# Patient Record
Sex: MALE | Race: WHITE | NOT HISPANIC OR LATINO | URBAN - METROPOLITAN AREA
[De-identification: names, ages, dates, MRNs, and addresses within clinical notes are randomized per-mention and may not be internally consistent; named-entity substitution may affect disease eponyms.]

---

## 2018-03-23 ENCOUNTER — EMERGENCY (EMERGENCY)
Facility: HOSPITAL | Age: 28
LOS: 1 days | Discharge: HOME | End: 2018-03-23
Attending: EMERGENCY MEDICINE | Admitting: EMERGENCY MEDICINE
Payer: SELF-PAY

## 2018-03-23 VITALS
OXYGEN SATURATION: 99 % | RESPIRATION RATE: 18 BRPM | SYSTOLIC BLOOD PRESSURE: 111 MMHG | TEMPERATURE: 100 F | WEIGHT: 195.11 LBS | HEART RATE: 53 BPM | DIASTOLIC BLOOD PRESSURE: 71 MMHG | HEIGHT: 71 IN

## 2018-03-23 LAB
ALBUMIN SERPL ELPH-MCNC: 4.7 G/DL — SIGNIFICANT CHANGE UP (ref 3.3–5.2)
ALP SERPL-CCNC: 56 U/L — SIGNIFICANT CHANGE UP (ref 40–120)
ALT FLD-CCNC: 30 U/L — SIGNIFICANT CHANGE UP
ANION GAP SERPL CALC-SCNC: 15 MMOL/L — SIGNIFICANT CHANGE UP (ref 5–17)
AST SERPL-CCNC: 19 U/L — SIGNIFICANT CHANGE UP
BASOPHILS # BLD AUTO: 0 K/UL — SIGNIFICANT CHANGE UP (ref 0–0.2)
BASOPHILS NFR BLD AUTO: 0.1 % — SIGNIFICANT CHANGE UP (ref 0–2)
BILIRUB SERPL-MCNC: 1.1 MG/DL — SIGNIFICANT CHANGE UP (ref 0.4–2)
BUN SERPL-MCNC: 13 MG/DL — SIGNIFICANT CHANGE UP (ref 8–20)
CALCIUM SERPL-MCNC: 9.7 MG/DL — SIGNIFICANT CHANGE UP (ref 8.6–10.2)
CHLORIDE SERPL-SCNC: 97 MMOL/L — LOW (ref 98–107)
CO2 SERPL-SCNC: 24 MMOL/L — SIGNIFICANT CHANGE UP (ref 22–29)
CREAT SERPL-MCNC: 1.05 MG/DL — SIGNIFICANT CHANGE UP (ref 0.5–1.3)
EOSINOPHIL # BLD AUTO: 0 K/UL — SIGNIFICANT CHANGE UP (ref 0–0.5)
EOSINOPHIL NFR BLD AUTO: 0 % — SIGNIFICANT CHANGE UP (ref 0–5)
GLUCOSE SERPL-MCNC: 99 MG/DL — SIGNIFICANT CHANGE UP (ref 70–115)
HCT VFR BLD CALC: 47.4 % — SIGNIFICANT CHANGE UP (ref 42–52)
HGB BLD-MCNC: 16.4 G/DL — SIGNIFICANT CHANGE UP (ref 14–18)
LACTATE BLDV-MCNC: 1.5 MMOL/L — SIGNIFICANT CHANGE UP (ref 0.5–2)
LYMPHOCYTES # BLD AUTO: 0.9 K/UL — LOW (ref 1–4.8)
LYMPHOCYTES # BLD AUTO: 6 % — LOW (ref 20–55)
MCHC RBC-ENTMCNC: 31.2 PG — HIGH (ref 27–31)
MCHC RBC-ENTMCNC: 34.6 G/DL — SIGNIFICANT CHANGE UP (ref 32–36)
MCV RBC AUTO: 90.3 FL — SIGNIFICANT CHANGE UP (ref 80–94)
MONOCYTES # BLD AUTO: 1.4 K/UL — HIGH (ref 0–0.8)
MONOCYTES NFR BLD AUTO: 9.6 % — SIGNIFICANT CHANGE UP (ref 3–10)
NEUTROPHILS # BLD AUTO: 12.6 K/UL — HIGH (ref 1.8–8)
NEUTROPHILS NFR BLD AUTO: 84 % — HIGH (ref 37–73)
PLATELET # BLD AUTO: 227 K/UL — SIGNIFICANT CHANGE UP (ref 150–400)
POTASSIUM SERPL-MCNC: 3.8 MMOL/L — SIGNIFICANT CHANGE UP (ref 3.5–5.3)
POTASSIUM SERPL-SCNC: 3.8 MMOL/L — SIGNIFICANT CHANGE UP (ref 3.5–5.3)
PROT SERPL-MCNC: 8.1 G/DL — SIGNIFICANT CHANGE UP (ref 6.6–8.7)
RBC # BLD: 5.25 M/UL — SIGNIFICANT CHANGE UP (ref 4.6–6.2)
RBC # FLD: 12.3 % — SIGNIFICANT CHANGE UP (ref 11–15.6)
SODIUM SERPL-SCNC: 136 MMOL/L — SIGNIFICANT CHANGE UP (ref 135–145)
WBC # BLD: 15 K/UL — HIGH (ref 4.8–10.8)
WBC # FLD AUTO: 15 K/UL — HIGH (ref 4.8–10.8)

## 2018-03-23 PROCEDURE — 99284 EMERGENCY DEPT VISIT MOD MDM: CPT

## 2018-03-23 RX ORDER — SODIUM CHLORIDE 9 MG/ML
2000 INJECTION INTRAMUSCULAR; INTRAVENOUS; SUBCUTANEOUS ONCE
Qty: 0 | Refills: 0 | Status: COMPLETED | OUTPATIENT
Start: 2018-03-23 | End: 2018-03-23

## 2018-03-23 RX ORDER — KETOROLAC TROMETHAMINE 30 MG/ML
30 SYRINGE (ML) INJECTION ONCE
Qty: 0 | Refills: 0 | Status: DISCONTINUED | OUTPATIENT
Start: 2018-03-23 | End: 2018-03-23

## 2018-03-23 RX ADMIN — SODIUM CHLORIDE 1000 MILLILITER(S): 9 INJECTION INTRAMUSCULAR; INTRAVENOUS; SUBCUTANEOUS at 23:22

## 2018-03-23 RX ADMIN — Medication 30 MILLIGRAM(S): at 23:21

## 2018-03-23 NOTE — ED ADULT TRIAGE NOTE - CHIEF COMPLAINT QUOTE
Pt c/o sore throat since last night with loss of appetite, pt reports diaphoresis at tachypnea at home, "woke up with 103.9 fever and last took tylenol at 3", c/o "getting tired to breathe"

## 2018-03-24 VITALS
RESPIRATION RATE: 18 BRPM | TEMPERATURE: 100 F | HEART RATE: 110 BPM | OXYGEN SATURATION: 98 % | SYSTOLIC BLOOD PRESSURE: 112 MMHG | DIASTOLIC BLOOD PRESSURE: 57 MMHG

## 2018-03-24 LAB
APPEARANCE UR: CLEAR — SIGNIFICANT CHANGE UP
BACTERIA # UR AUTO: ABNORMAL
BILIRUB UR-MCNC: NEGATIVE — SIGNIFICANT CHANGE UP
COLOR SPEC: YELLOW — SIGNIFICANT CHANGE UP
DIFF PNL FLD: NEGATIVE — SIGNIFICANT CHANGE UP
EPI CELLS # UR: SIGNIFICANT CHANGE UP
GLUCOSE UR QL: NEGATIVE MG/DL — SIGNIFICANT CHANGE UP
KETONES UR-MCNC: ABNORMAL
LEUKOCYTE ESTERASE UR-ACNC: ABNORMAL
NITRITE UR-MCNC: NEGATIVE — SIGNIFICANT CHANGE UP
PH UR: 8 — SIGNIFICANT CHANGE UP (ref 5–8)
PROT UR-MCNC: 15 MG/DL
RBC CASTS # UR COMP ASSIST: SIGNIFICANT CHANGE UP /HPF (ref 0–4)
S PYO AG SPEC QL IA: POSITIVE
SP GR SPEC: 1.01 — SIGNIFICANT CHANGE UP (ref 1.01–1.02)
UROBILINOGEN FLD QL: 1 MG/DL
WBC UR QL: SIGNIFICANT CHANGE UP

## 2018-03-24 PROCEDURE — 81001 URINALYSIS AUTO W/SCOPE: CPT

## 2018-03-24 PROCEDURE — 96374 THER/PROPH/DIAG INJ IV PUSH: CPT

## 2018-03-24 PROCEDURE — 87081 CULTURE SCREEN ONLY: CPT

## 2018-03-24 PROCEDURE — 99284 EMERGENCY DEPT VISIT MOD MDM: CPT | Mod: 25

## 2018-03-24 PROCEDURE — 85027 COMPLETE CBC AUTOMATED: CPT

## 2018-03-24 PROCEDURE — 80053 COMPREHEN METABOLIC PANEL: CPT

## 2018-03-24 PROCEDURE — 83605 ASSAY OF LACTIC ACID: CPT

## 2018-03-24 PROCEDURE — 87880 STREP A ASSAY W/OPTIC: CPT

## 2018-03-24 PROCEDURE — 36415 COLL VENOUS BLD VENIPUNCTURE: CPT

## 2018-03-24 PROCEDURE — 96375 TX/PRO/DX INJ NEW DRUG ADDON: CPT

## 2018-03-24 PROCEDURE — 87040 BLOOD CULTURE FOR BACTERIA: CPT

## 2018-03-24 RX ORDER — AMPICILLIN SODIUM AND SULBACTAM SODIUM 250; 125 MG/ML; MG/ML
3 INJECTION, POWDER, FOR SUSPENSION INTRAMUSCULAR; INTRAVENOUS ONCE
Qty: 0 | Refills: 0 | Status: COMPLETED | OUTPATIENT
Start: 2018-03-24 | End: 2018-03-24

## 2018-03-24 RX ORDER — DEXAMETHASONE 0.5 MG/5ML
10 ELIXIR ORAL ONCE
Qty: 0 | Refills: 0 | Status: COMPLETED | OUTPATIENT
Start: 2018-03-24 | End: 2018-03-24

## 2018-03-24 RX ADMIN — AMPICILLIN SODIUM AND SULBACTAM SODIUM 200 GRAM(S): 250; 125 INJECTION, POWDER, FOR SUSPENSION INTRAMUSCULAR; INTRAVENOUS at 02:14

## 2018-03-24 RX ADMIN — Medication 1 TABLET(S): at 03:14

## 2018-03-24 RX ADMIN — Medication 102 MILLIGRAM(S): at 02:09

## 2018-03-24 NOTE — ED PROVIDER NOTE - NS ED ROS FT
no weight change, +fever or chills  no rash, no bruises  no visual changes no eye discharge  no cough cold or congestion, +sore throat, +difficulty swallowing   no sob, no chest pain  no orthopnea, no pnd  no abd pain, no v/d, +nausea  no hematuria, no change in urinary habits  no joint pain, no deformity  no headache, no paresthesia

## 2018-03-24 NOTE — ED PROVIDER NOTE - CONDUCTED A DETAILED DISCUSSION WITH PATIENT AND/OR GUARDIAN REGARDING, MDM
lab results return to ED if symptoms worsen, persist or questions arise/lab results/need for outpatient follow-up

## 2018-03-24 NOTE — ED ADULT NURSE NOTE - OBJECTIVE STATEMENT
patient states that he has had throat pain, pressure to his head and ears and shaking. patient states that he is unable to swallow, given ice chips with minimal relief. patient skin warm to touch, denies any difficulty breathing not appearing in any respiratory distress. patient states that he last took motrin at 2pm yesterday

## 2018-03-24 NOTE — ED PROVIDER NOTE - OBJECTIVE STATEMENT
28 y/o M pt with a hx of chicken pox, shingles presents with difficulty swallowing, sore throat, fevers, loss of appetite, nausea x 2 days. 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 Denies diarrhea, CP, SOB, abd pain. NKDA. 28 y/o M pt with a hx of chicken pox, shingles presents with difficulty swallowing, sore throat, fevers, loss of appetite, nausea x 2 days. 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 no complaints diarrhea, CP, SOB, abd pain. NKDA.

## 2018-03-24 NOTE — ED PROVIDER NOTE - PROGRESS NOTE DETAILS
labs discussed with patient  follow upiwth medical doctor , picked augment, mild swelling at palate noted, early phlegnum vs cellulitis

## 2018-03-24 NOTE — ED PROVIDER NOTE - PHYSICAL EXAMINATION
Constitutional : Appears fatigue, talking in full sentences  Head :NC AT , no swelling  Eyes :eomi, no swelling  Mouth :mm moist, difficulty swallowing saliva, b/l pharyngeal swelling with exudates , minimal swelling in distal phalanx   Neck : supple, trachea in midline  Chest :Kj air entry, symm chest expansion, no distress  Heart :S1 S2 distant  Abdomen :abd soft, non tender  Musc/Skel :ext no swelling, no deformity, no spine tenderness, distal pulses present  Neuro  :AAO 3 no focal deficits  skin: no rash

## 2018-03-28 LAB
CULTURE RESULTS: SIGNIFICANT CHANGE UP
CULTURE RESULTS: SIGNIFICANT CHANGE UP
SPECIMEN SOURCE: SIGNIFICANT CHANGE UP
SPECIMEN SOURCE: SIGNIFICANT CHANGE UP

## 2020-02-05 ENCOUNTER — EMERGENCY (EMERGENCY)
Facility: HOSPITAL | Age: 30
LOS: 1 days | Discharge: HOME | End: 2020-02-05
Attending: EMERGENCY MEDICINE
Payer: COMMERCIAL

## 2020-02-05 VITALS
TEMPERATURE: 99 F | RESPIRATION RATE: 20 BRPM | HEART RATE: 102 BPM | SYSTOLIC BLOOD PRESSURE: 116 MMHG | OXYGEN SATURATION: 98 % | DIASTOLIC BLOOD PRESSURE: 67 MMHG

## 2020-02-05 VITALS
WEIGHT: 195.11 LBS | RESPIRATION RATE: 20 BRPM | HEART RATE: 129 BPM | OXYGEN SATURATION: 99 % | DIASTOLIC BLOOD PRESSURE: 76 MMHG | SYSTOLIC BLOOD PRESSURE: 119 MMHG | TEMPERATURE: 100 F

## 2020-02-05 DIAGNOSIS — Z90.49 ACQUIRED ABSENCE OF OTHER SPECIFIED PARTS OF DIGESTIVE TRACT: Chronic | ICD-10-CM

## 2020-02-05 LAB
ALBUMIN SERPL ELPH-MCNC: 4.8 G/DL — SIGNIFICANT CHANGE UP (ref 3.3–5.2)
ALP SERPL-CCNC: 44 U/L — SIGNIFICANT CHANGE UP (ref 40–120)
ALT FLD-CCNC: 51 U/L — HIGH
ANION GAP SERPL CALC-SCNC: 12 MMOL/L — SIGNIFICANT CHANGE UP (ref 5–17)
APPEARANCE UR: CLEAR — SIGNIFICANT CHANGE UP
AST SERPL-CCNC: 29 U/L — SIGNIFICANT CHANGE UP
B PERT DNA SPEC QL NAA+PROBE: SIGNIFICANT CHANGE UP
BACTERIA # UR AUTO: NEGATIVE — SIGNIFICANT CHANGE UP
BASOPHILS # BLD AUTO: 0.03 K/UL — SIGNIFICANT CHANGE UP (ref 0–0.2)
BASOPHILS NFR BLD AUTO: 0.5 % — SIGNIFICANT CHANGE UP (ref 0–2)
BILIRUB SERPL-MCNC: 0.5 MG/DL — SIGNIFICANT CHANGE UP (ref 0.4–2)
BILIRUB UR-MCNC: NEGATIVE — SIGNIFICANT CHANGE UP
BUN SERPL-MCNC: 11 MG/DL — SIGNIFICANT CHANGE UP (ref 8–20)
C PNEUM DNA SPEC QL NAA+PROBE: SIGNIFICANT CHANGE UP
CALCIUM SERPL-MCNC: 9.3 MG/DL — SIGNIFICANT CHANGE UP (ref 8.6–10.2)
CHLORIDE SERPL-SCNC: 99 MMOL/L — SIGNIFICANT CHANGE UP (ref 98–107)
CO2 SERPL-SCNC: 26 MMOL/L — SIGNIFICANT CHANGE UP (ref 22–29)
COLOR SPEC: YELLOW — SIGNIFICANT CHANGE UP
CREAT SERPL-MCNC: 0.88 MG/DL — SIGNIFICANT CHANGE UP (ref 0.5–1.3)
DIFF PNL FLD: NEGATIVE — SIGNIFICANT CHANGE UP
EOSINOPHIL # BLD AUTO: 0.03 K/UL — SIGNIFICANT CHANGE UP (ref 0–0.5)
EOSINOPHIL NFR BLD AUTO: 0.5 % — SIGNIFICANT CHANGE UP (ref 0–6)
EPI CELLS # UR: NEGATIVE — SIGNIFICANT CHANGE UP
FLUAV H1 2009 PAND RNA SPEC QL NAA+PROBE: SIGNIFICANT CHANGE UP
FLUAV H1 RNA SPEC QL NAA+PROBE: SIGNIFICANT CHANGE UP
FLUAV H3 RNA SPEC QL NAA+PROBE: DETECTED
FLUAV SUBTYP SPEC NAA+PROBE: DETECTED — SIGNIFICANT CHANGE UP
FLUBV RNA SPEC QL NAA+PROBE: SIGNIFICANT CHANGE UP
GLUCOSE SERPL-MCNC: 93 MG/DL — SIGNIFICANT CHANGE UP (ref 70–99)
GLUCOSE UR QL: NEGATIVE — SIGNIFICANT CHANGE UP
HADV DNA SPEC QL NAA+PROBE: SIGNIFICANT CHANGE UP
HCOV PNL SPEC NAA+PROBE: SIGNIFICANT CHANGE UP
HCT VFR BLD CALC: 44.6 % — SIGNIFICANT CHANGE UP (ref 39–50)
HGB BLD-MCNC: 15 G/DL — SIGNIFICANT CHANGE UP (ref 13–17)
HIV 1 & 2 AB SERPL IA.RAPID: SIGNIFICANT CHANGE UP
HMPV RNA SPEC QL NAA+PROBE: SIGNIFICANT CHANGE UP
HPIV1 RNA SPEC QL NAA+PROBE: SIGNIFICANT CHANGE UP
HPIV2 RNA SPEC QL NAA+PROBE: SIGNIFICANT CHANGE UP
HPIV3 RNA SPEC QL NAA+PROBE: SIGNIFICANT CHANGE UP
HPIV4 RNA SPEC QL NAA+PROBE: SIGNIFICANT CHANGE UP
IMM GRANULOCYTES NFR BLD AUTO: 0.2 % — SIGNIFICANT CHANGE UP (ref 0–1.5)
KETONES UR-MCNC: NEGATIVE — SIGNIFICANT CHANGE UP
LEUKOCYTE ESTERASE UR-ACNC: NEGATIVE — SIGNIFICANT CHANGE UP
LYMPHOCYTES # BLD AUTO: 0.65 K/UL — LOW (ref 1–3.3)
LYMPHOCYTES # BLD AUTO: 10.8 % — LOW (ref 13–44)
MCHC RBC-ENTMCNC: 31 PG — SIGNIFICANT CHANGE UP (ref 27–34)
MCHC RBC-ENTMCNC: 33.6 GM/DL — SIGNIFICANT CHANGE UP (ref 32–36)
MCV RBC AUTO: 92.1 FL — SIGNIFICANT CHANGE UP (ref 80–100)
MONOCYTES # BLD AUTO: 0.84 K/UL — SIGNIFICANT CHANGE UP (ref 0–0.9)
MONOCYTES NFR BLD AUTO: 13.9 % — SIGNIFICANT CHANGE UP (ref 2–14)
NEUTROPHILS # BLD AUTO: 4.48 K/UL — SIGNIFICANT CHANGE UP (ref 1.8–7.4)
NEUTROPHILS NFR BLD AUTO: 74.1 % — SIGNIFICANT CHANGE UP (ref 43–77)
NITRITE UR-MCNC: NEGATIVE — SIGNIFICANT CHANGE UP
PH UR: 6 — SIGNIFICANT CHANGE UP (ref 5–8)
PLATELET # BLD AUTO: 233 K/UL — SIGNIFICANT CHANGE UP (ref 150–400)
POTASSIUM SERPL-MCNC: 4 MMOL/L — SIGNIFICANT CHANGE UP (ref 3.5–5.3)
POTASSIUM SERPL-SCNC: 4 MMOL/L — SIGNIFICANT CHANGE UP (ref 3.5–5.3)
PROCALCITONIN SERPL-MCNC: 0.11 NG/ML — HIGH (ref 0.02–0.1)
PROT SERPL-MCNC: 6.9 G/DL — SIGNIFICANT CHANGE UP (ref 6.6–8.7)
PROT UR-MCNC: NEGATIVE — SIGNIFICANT CHANGE UP
RAPID RVP RESULT: DETECTED
RBC # BLD: 4.84 M/UL — SIGNIFICANT CHANGE UP (ref 4.2–5.8)
RBC # FLD: 12.1 % — SIGNIFICANT CHANGE UP (ref 10.3–14.5)
RBC CASTS # UR COMP ASSIST: NEGATIVE /HPF — SIGNIFICANT CHANGE UP (ref 0–4)
RV+EV RNA SPEC QL NAA+PROBE: SIGNIFICANT CHANGE UP
SODIUM SERPL-SCNC: 137 MMOL/L — SIGNIFICANT CHANGE UP (ref 135–145)
SP GR SPEC: 1.01 — SIGNIFICANT CHANGE UP (ref 1.01–1.02)
UROBILINOGEN FLD QL: NEGATIVE — SIGNIFICANT CHANGE UP
WBC # BLD: 6.04 K/UL — SIGNIFICANT CHANGE UP (ref 3.8–10.5)
WBC # FLD AUTO: 6.04 K/UL — SIGNIFICANT CHANGE UP (ref 3.8–10.5)
WBC UR QL: NEGATIVE — SIGNIFICANT CHANGE UP

## 2020-02-05 PROCEDURE — 99284 EMERGENCY DEPT VISIT MOD MDM: CPT

## 2020-02-05 PROCEDURE — 71045 X-RAY EXAM CHEST 1 VIEW: CPT | Mod: 26

## 2020-02-05 PROCEDURE — 71045 X-RAY EXAM CHEST 1 VIEW: CPT

## 2020-02-05 PROCEDURE — 85027 COMPLETE CBC AUTOMATED: CPT

## 2020-02-05 PROCEDURE — 36415 COLL VENOUS BLD VENIPUNCTURE: CPT

## 2020-02-05 PROCEDURE — 99284 EMERGENCY DEPT VISIT MOD MDM: CPT | Mod: 25

## 2020-02-05 PROCEDURE — 80053 COMPREHEN METABOLIC PANEL: CPT

## 2020-02-05 PROCEDURE — 81001 URINALYSIS AUTO W/SCOPE: CPT

## 2020-02-05 PROCEDURE — 87486 CHLMYD PNEUM DNA AMP PROBE: CPT

## 2020-02-05 PROCEDURE — 96361 HYDRATE IV INFUSION ADD-ON: CPT

## 2020-02-05 PROCEDURE — 87040 BLOOD CULTURE FOR BACTERIA: CPT

## 2020-02-05 PROCEDURE — 87491 CHLMYD TRACH DNA AMP PROBE: CPT

## 2020-02-05 PROCEDURE — 87591 N.GONORRHOEAE DNA AMP PROB: CPT

## 2020-02-05 PROCEDURE — 99235 HOSP IP/OBS SAME DATE MOD 70: CPT

## 2020-02-05 PROCEDURE — 87086 URINE CULTURE/COLONY COUNT: CPT

## 2020-02-05 PROCEDURE — 86703 HIV-1/HIV-2 1 RESULT ANTBDY: CPT

## 2020-02-05 PROCEDURE — 87798 DETECT AGENT NOS DNA AMP: CPT

## 2020-02-05 PROCEDURE — 84145 PROCALCITONIN (PCT): CPT

## 2020-02-05 PROCEDURE — 96360 HYDRATION IV INFUSION INIT: CPT

## 2020-02-05 PROCEDURE — 87581 M.PNEUMON DNA AMP PROBE: CPT

## 2020-02-05 PROCEDURE — 87633 RESP VIRUS 12-25 TARGETS: CPT

## 2020-02-05 PROCEDURE — 83605 ASSAY OF LACTIC ACID: CPT

## 2020-02-05 RX ORDER — SODIUM CHLORIDE 9 MG/ML
1000 INJECTION INTRAMUSCULAR; INTRAVENOUS; SUBCUTANEOUS ONCE
Refills: 0 | Status: COMPLETED | OUTPATIENT
Start: 2020-02-05 | End: 2020-02-05

## 2020-02-05 RX ORDER — IBUPROFEN 200 MG
400 TABLET ORAL ONCE
Refills: 0 | Status: COMPLETED | OUTPATIENT
Start: 2020-02-05 | End: 2020-02-05

## 2020-02-05 RX ORDER — ACETAMINOPHEN 500 MG
650 TABLET ORAL ONCE
Refills: 0 | Status: COMPLETED | OUTPATIENT
Start: 2020-02-05 | End: 2020-02-05

## 2020-02-05 RX ADMIN — Medication 650 MILLIGRAM(S): at 11:27

## 2020-02-05 RX ADMIN — SODIUM CHLORIDE 1000 MILLILITER(S): 9 INJECTION INTRAMUSCULAR; INTRAVENOUS; SUBCUTANEOUS at 11:27

## 2020-02-05 RX ADMIN — Medication 400 MILLIGRAM(S): at 09:22

## 2020-02-05 RX ADMIN — Medication 650 MILLIGRAM(S): at 09:22

## 2020-02-05 RX ADMIN — Medication 400 MILLIGRAM(S): at 11:27

## 2020-02-05 RX ADMIN — SODIUM CHLORIDE 1000 MILLILITER(S): 9 INJECTION INTRAMUSCULAR; INTRAVENOUS; SUBCUTANEOUS at 09:18

## 2020-02-05 NOTE — ED PROVIDER NOTE - PROGRESS NOTE DETAILS
Sammy: spoke to Licha SKAGGS, checking with state if anything else to do. Sammy: spoke to Willow SKAGGS, checking with state if anything else to do. Sammy: GILES recommending no further work up for mers and just keep pt with self isolation until feeling better with return precautions. rvp after that came back positive for flu a. pt non toxic, no resp distress. outside window for tamiflu. return precautions, supportive care. results given. stable for d/c.

## 2020-02-05 NOTE — ED PROVIDER NOTE - PATIENT PORTAL LINK FT
You can access the FollowMyHealth Patient Portal offered by [facility] by registering at the following website: [email or web address]. By joining [employer] [facility]’s FollowMyHealth portal, you will also be able to view your health information using other applications (apps) compatible with our system.

## 2020-02-05 NOTE — ED ADULT NURSE NOTE - OBJECTIVE STATEMENT
Pt received in isolation room one which was labeled for airborne and contact precautions. 29 year old male in no acute distress, alert and oriented x 3, c/o cough productive of white/yellow sputum onset Sunday and worsening since then with tympanic fever of 100.8 this morning. Pt denies sick contacts, recent travel to [facility] returning on Saturday. Pt with cardiac and O2 monitoring in place, noted to be tachycardic 110, SpO2 96% on room air, fall precautions in place, mother at bedside. Pt reports having finished a Z-pack Saturday or Sunday which was prescribed while in [facility] for possible bacterial prostatitis and reports he has appt upcoming with local urologist to follow up on same.

## 2020-02-05 NOTE — ED ADULT NURSE REASSESSMENT NOTE - NS ED NURSE REASSESS COMMENT FT1
Charge GEORGIE Peerz aware of pt and will call ID to make aware. Per Ana Iso Rm 5 is negative pressure. Will continue to monitor pt and await results.

## 2020-02-05 NOTE — ED PROVIDER NOTE - OBJECTIVE STATEMENT
28 y/o male no pmh recently living in [facility] for 4 months, returned 4 days ago, c/o 2-3 days of fever, myaglias, rhinorrhea, cough, occsainal sob/cp with coughing (no sob/cp currently) and mild diarrhea which he attributes to change in food. Denies headache, photophobia, neck pain, abd pain, vomiting, urinary symptoms. No weight loss, coughing blood, night sweats.     ROS: + fever/chills. No eye pain/changes in vision, No ear pain/sore throat/dysphagia, . No abdominal pain, N/V, no black/bloody bm. No dysuria/frequency/discharge, No headache. No Dizziness.    No rashes or breaks in skin. No numbness/tingling/weakness.

## 2020-02-05 NOTE — ED PROVIDER NOTE - PRINCIPAL DIAGNOSIS
Let her know I'm aware of her request and that it is being looked into. Please contact ortho department and see if we have a sports ortho specialist and see also which ortho specialist specializes in feet. Influenza A

## 2020-02-05 NOTE — ED ADULT NURSE REASSESSMENT NOTE - NS ED NURSE REASSESS COMMENT FT1
Pt in no acute distress, vss, reports relief from body aches but continues to have headache 5/10. Additional labs collected, plan of care explained, pt safety maintained, fall precautions in place, will continue to monitor.

## 2020-02-05 NOTE — ED ADULT NURSE NOTE - PMH
No pertinent past medical history    Prostatitis  had appt Monday with urologist in [facility]. Was being worked up for this while in [facility].

## 2020-02-05 NOTE — ED ADULT NURSE NOTE - CHIEF COMPLAINT QUOTE
Patient alert and oriented x4 c/o nasal congestion with productive cough x3 days. denies chest pain or sob. breathing unlabored. denies n/v. also c/o diarrhea. no signs of respiratory distress noted. color normal for ethnicity. just back from [facility] [place] on Saturday. given mask in triage. patient placed in isolation room, Charge nurse aware.

## 2020-02-05 NOTE — CONSULT NOTE ADULT - SUBJECTIVE AND OBJECTIVE BOX
[facility]  INFECTIOUS DISEASES AND INTERNAL MEDICINE at [place]  =======================================================  Bj Barajas MD  Diplomates American Board of Internal Medicine and Infectious Diseases  =======================================================    [facility]-[id number]  JAKY GARDNER     CC: fever and body aches  HPI: 30y/o man with no significant PMH was seen in ED with fever and flu like symptoms. He came back from [facility] on 2/1 and his symptoms started on 2/2.   His symptoms are headache, generalized body pain (myalgia and arthralgia), fever, chills, cough and sputum. Sometimes when coughs feels shortness of breath.   In last 2-3 weeks of his stay in [facility] where he stayed for about 4 months, had dysuria and pelvic pain for which had a negative UC and STD work up which was negative but Zpack was given for possible prostatitis.  He had few times diarrhea which happened with zpack in the past.   No contact with animals or camel ride, no bug bites, no contact with any sick person but states that in the plane every one was sick coughing and sneezing.      No pertinent past medical history  History of appendectomy    Social Hx: smokes cig for 3-4 years, about 1/2 ppd, social drinking, marijuana socially.     FAMILY HISTORY: None    Allergies  No Known Allergies    Antibiotics:  None      REVIEW OF SYSTEMS:  CONSTITUTIONAL:  + Fever or chills  HEENT:  No diplopia or blurred vision.  No sore throat or runny nose.  CARDIOVASCULAR:  No chest pain or SOB.  RESPIRATORY:  +ough, shortness of breath,  GASTROINTESTINAL:  No nausea, vomiting, +diarrhea.  GENITOURINARY:  No dysuria, frequency or urgency. No Blood in urine  MUSCULOSKELETAL:  + joint aches, + muscle pain  SKIN:  No change in skin, hair or nails.  NEUROLOGIC:  No paresthesias, fasciculations, seizures or weakness.  PSYCHIATRIC:  No disorder of thought or mood.  ENDOCRINE:  No heat or cold intolerance, polyuria or polydipsia.  HEMATOLOGICAL:  No easy bruising or bleeding.     Physical Exam:  Vital Signs Last 24 Hrs  T(C): 37.6 (05 Feb 2020 06:19), Max: 37.6 (05 Feb 2020 06:19)  T(F): 99.6 (05 Feb 2020 06:19), Max: 99.6 (05 Feb 2020 06:19)  HR: 129 (05 Feb 2020 06:19) (129 - 129)  BP: 119/76 (05 Feb 2020 06:19) (119/76 - 119/76)  RR: 20 (05 Feb 2020 06:19) (20 - 20)  SpO2: 99% (05 Feb 2020 06:19) (99% - 99%)  Weight (kg): 88.5 (02-05 @ 06:19)  GEN: NAD  HEENT: normocephalic and atraumatic. EOMI. PERRL.    NECK: Supple.  No lymphadenopathy   LUNGS: Clear to auscultation.  HEART: Regular rate and rhythm without murmur.  ABDOMEN: Soft, nontender, and nondistended.  Positive bowel sounds.    : No CVA tenderness  EXTREMITIES: Without any cyanosis, clubbing, rash, lesions or edema.  NEUROLOGIC: grossly intact.  PSYCHIATRIC: Appropriate affect .  SKIN: No ulceration or induration present.    Labs:  Pending     RECENT CULTURES:  Pending       All imaging and other data have been reviewed.      Assessment and Plan:   30y/o man with no significant PMH was seen in ED with fever and flu like symptoms. He came back from [facility] on 2/1 and his symptoms started on 2/2.   Most likely viral syndrome, routine URI or flu.     Flu like symptoms     - Blood culture   - RVP  - UA and UC  - Lactate, procalcitonin  - GC/CT urine  - Chest X ray  - Will watch off antibiotics for now.     Will follow.    Case discussed with patient and mother.

## 2020-02-05 NOTE — ED PROVIDER NOTE - CLINICAL SUMMARY MEDICAL DECISION MAKING FREE TEXT BOX
pt with uri symtpoms, [place]. well appearing, tachy likely from fever, no resp distress. will treat symptoms, rvp, contact/airborne precautions, discuss with ID.

## 2020-02-05 NOTE — ED ADULT TRIAGE NOTE - CHIEF COMPLAINT QUOTE
Patient alert and oriented x4 c/o nasal congestion with productive cough x3 days. denies chest pain or sob. denies n/v. also c/o diarrhea. color normal for ethnicity. just back from [facility] landed on saturday. given mask in triage. Patient alert and oriented x4 c/o nasal congestion with productive cough x3 days. denies chest pain or sob. denies n/v. also c/o diarrhea. color normal for ethnicity. just back from [facility] [place] on Saturday. given mask in triage. patient placed in isolation room, Charge nurse aware. Patient alert and oriented x4 c/o nasal congestion with productive cough x3 days. denies chest pain or sob. breathing unlabored. denies n/v. also c/o diarrhea. no signs of respiratory distress noted. color normal for ethnicity. just back from [facility] [place] landed on Saturday. given mask in triage. patient placed in isolation room, Charge nurse aware.

## 2020-02-05 NOTE — ED PROVIDER NOTE - PHYSICAL EXAMINATION
Gen: No acute distress, non toxic, well appearing  HEENT: Mucous membranes moist, pink conjunctivae, EOMI  CV: tachy, nl s1/s2.  Resp: CTAB, normal rate and effort  GI: Abdomen soft, NT, ND. No rebound, no guarding  : No CVAT  Neuro: A&O x 3, moving all 4 extremities  MSK: No spine or joint tenderness to palpation  Skin: No rashes. intact and perfused.

## 2020-02-05 NOTE — ED ADULT NURSE REASSESSMENT NOTE - NS ED NURSE REASSESS COMMENT FT1
Pt reports he traveled through [facility] on way home from [facility], Dr Dash aware of this and aware of recent abx use to r/o prostatitis and sx of painful urination.

## 2020-02-06 LAB
C TRACH RRNA SPEC QL NAA+PROBE: SIGNIFICANT CHANGE UP
CULTURE RESULTS: NO GROWTH — SIGNIFICANT CHANGE UP
N GONORRHOEA RRNA SPEC QL NAA+PROBE: SIGNIFICANT CHANGE UP
SPECIMEN SOURCE: SIGNIFICANT CHANGE UP
SPECIMEN SOURCE: SIGNIFICANT CHANGE UP

## 2020-02-10 LAB
CULTURE RESULTS: SIGNIFICANT CHANGE UP
SPECIMEN SOURCE: SIGNIFICANT CHANGE UP